# Patient Record
Sex: FEMALE | Race: WHITE
[De-identification: names, ages, dates, MRNs, and addresses within clinical notes are randomized per-mention and may not be internally consistent; named-entity substitution may affect disease eponyms.]

---

## 2019-01-24 ENCOUNTER — HOSPITAL ENCOUNTER (OUTPATIENT)
Dept: HOSPITAL 75 - PREOP | Age: 19
Discharge: HOME | End: 2019-01-24
Attending: INTERNAL MEDICINE
Payer: COMMERCIAL

## 2019-01-24 VITALS — WEIGHT: 140 LBS | HEIGHT: 67 IN | BODY MASS INDEX: 21.97 KG/M2

## 2019-01-24 DIAGNOSIS — Z01.818: Primary | ICD-10-CM

## 2019-01-24 NOTE — HISTORY AND PHYSICAL
DATE OF SERVICE:  



COLONOSCOPY H AND P



HISTORY OF PRESENT ILLNESS:

The patient is a 18-year-old white female referred by Dr. Bergeron for

evaluation for colonoscopy due to a 4-month history of intermittent bright red

blood per rectum.  The patient reports she tends towards constipation, but

denies any associated pain with defecation more times than not.  She has been

passing bright red blood per rectum without clots.  She has had no diarrhea. 

Sometimes there will be some associated abdominal pain.  Prior to defecation,

but there has been no reported anal pain.  She has no past history of

hemorrhoids and has not been aware of any mass type of fact when wiping.



FAMILY HISTORY:

She reports a family history of colon polyps in her mother in her 50s, but is

not aware of any family history for colon cancer or GI tract malignancy and not

aware of any family history for polyposis syndromes.



PAST SURGICAL HISTORY:

Significant for ganglion cyst removal.



SOCIAL HISTORY:

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

occasional social alcohol consumption.



PHYSICAL EXAMINATION:

GENERAL:  Reveals a pleasant normal weight white female who did not appear to be

in acute distress.

VITAL SIGNS:  Weight was 143 pounds, blood pressure 134/80, heart rate 70 and

regular.

HEENT:  She is a Mallampati class 1 oropharyngeal configuration without evidence

for erythema.

CHEST:  Clear to auscultation.

CARDIOVASCULAR:  Reveals a regular rate and rhythm without murmur, S3, S4 or

clicks.

ABDOMEN:  Soft, supple without mass, organomegaly or tenderness.

EXTREMITIES:  Reveal no cyanosis, clubbing or edema.



ASSESSMENT AND PLAN:

For investigation of painless bright red blood per rectum ongoing for four

months, but with negative digital rectal examination and per Dr. Bergeron, she

is set up for diagnostic colonoscopy on 01/25.  Because she does have some

associated abdominal pain, bilateral lower quadrant in nature prior to 
defecation with a past history of constipation, I am concerned about irritable 
bowel, so for the patient's comfort she is being set up with Diprivan 
anesthesia.  Prep instructions were

given and questions were answered.





Job ID: [id number]

DocumentID: [id number]

Dictated Date:  01/23/2019 19:43:24

Transcription Date: 01/23/2019 20:08:54

Dictated By: YULIANA NGO MD

[facility]

## 2019-01-25 ENCOUNTER — HOSPITAL ENCOUNTER (OUTPATIENT)
Dept: HOSPITAL 75 - ENDO | Age: 19
Discharge: HOME | End: 2019-01-25
Attending: INTERNAL MEDICINE
Payer: COMMERCIAL

## 2019-01-25 VITALS — HEIGHT: 67 IN | WEIGHT: 140 LBS | BODY MASS INDEX: 21.97 KG/M2

## 2019-01-25 VITALS — SYSTOLIC BLOOD PRESSURE: 125 MMHG | DIASTOLIC BLOOD PRESSURE: 84 MMHG

## 2019-01-25 VITALS — SYSTOLIC BLOOD PRESSURE: 96 MMHG | DIASTOLIC BLOOD PRESSURE: 54 MMHG

## 2019-01-25 VITALS — DIASTOLIC BLOOD PRESSURE: 84 MMHG | SYSTOLIC BLOOD PRESSURE: 125 MMHG

## 2019-01-25 DIAGNOSIS — K52.9: Primary | ICD-10-CM

## 2019-01-25 PROCEDURE — 84703 CHORIONIC GONADOTROPIN ASSAY: CPT

## 2019-01-25 NOTE — ANESTHESIA-GENERAL POST-OP
MAC


Patient Condition


Mental Status/LOC:  Same as Preop


Cardiovascular:  Satisfactory


Nausea/Vomiting:  Absent


Respiratory:  Satisfactory


Pain:  Controlled


Complications:  Absent





Post Op Complications


Complications


None





Follow Up Care/Instructions


Patient Instructions


None needed.





Anesthesiology Discharge Order


Discharge Order


Patient was seen after the procedure and she was doing well, no complaints, 

stable vital signs, no apparent adverse anesthesia problems.











SHARLA GONZALEZ DO Jan 25, 2019 13:10

## 2019-01-25 NOTE — PRE-OP NOTE & CONSCIOUS SEDAT
Pre-Operative Progress Note


H&P Reviewed


The H&P was reviewed, patient examined and no changes noted.


Date H&P Reviewed:  Jan 25, 2019


Time H&P Reviewed:  10:45





Conscious Sedation Pre-Proced


ASA Score


1


For ASA 3 and 4: Consider anesthesia and medical clearance. Also, for 

patients with a history of failed moderate sedation consider anesthesia.

















Airway 


 


Lungs 


 


Heart 


 


 ASA score


 


 ASA 1: a normal healthy patient


 


 ASA 2:  a patient with a mild systemic disease (mid diabetes, controlled 

hypertension, obesity 


 


 ASA 3:  a patient with a severe systemic disease that limits activity  (angina

, COPD, prior Myocardial infarction)


 


 ASA 4:  a patient with an incapacitating disease that is a constant threat to 

life (CHF, renal failure)


 


 ASA 5:  a moribund patient not expected to survive 24 hrs.  (ruptured aneurysm)


 


 ASA 6:  a declared brain dead patient whose organs are being harvested.


 


 For emergent operations, add the letter E after the classification











Mallampati Classification


Grade 1





Sedation Plan


Analgesia, Amnesia, Plan communicated to team members, Discussed options with 

patient/fam, Discussed risks with patient/fam


The patient is an appropriate candidate to undergo the planned procedure, 

sedation, and anesthesia.





The patient immediately re-assessed prior to indication.











YULIANA NGO MD Jan 25, 2019 10:59

## 2019-01-25 NOTE — OPERATIVE REPORT
DATE OF SERVICE:  01/25/2019



COLONOSCOPY SUMMARY



INDICATION FOR THE PROCEDURE:

Persistent rectal bleeding.



The patient was placed in left lateral decubitus position.  Prior to undergoing

colonoscopy, digital rectal evaluation was performed.  Anal sphincter tone was

normal and the perianal reflex was intact.  No abnormalities were noted on

digital inspection of the anal canal or distal rectal vault.  The colonoscope

was inserted in the rectum and under direct visualization, advanced to the

cecum.  The cecum was identified by identification of the ileocecal valve and

cecal strap.  The quality of the prep was fair.



FINDINGS:

There was no evidence for internal or external hemorrhoids.  Beginning at the

dentate line and extending to about 20 cm in the distal sigmoid colon was

diffuse erythema and edema with several small erosions and the distal rectum

compatible with ulcerative colitis.  There is a demarcation line again in the

distal sigmoid colon.  Mid sigmoid colon biopsy was obtained and submitted for

histopathology as well as several rectal biopsies.  Proximal to the inflammatory

demarcation line, colonoscopy was unremarkable to the cecum including terminal

ileum.



ASSESSMENT:

Colonoscopic findings are compatible with left-sided ulcerative colitis with

demarcation line in the distal sigmoid colon at 20 cm from the anal verge. 

After discussion with the patient in regards to treatment options, we will

initiate sulfasalazine 1 gram after meals for one week and then increase to 2

grams b.i.d. with likely need for further increase to 3 grams b.i.d. pending

response.



I thank you for the referral of this pleasant lady.  Histopathology reports are

pending and will dictate an amended report when available.



Sincerely,





Job ID: [id number]

DocumentID: [id number]

Dictated Date:  01/25/2019 11:59:52

Transcription Date: 01/25/2019 15:39:08

Dictated By: YULIANA NGO MD

[facility]